# Patient Record
Sex: FEMALE | Race: WHITE | NOT HISPANIC OR LATINO | URBAN - METROPOLITAN AREA
[De-identification: names, ages, dates, MRNs, and addresses within clinical notes are randomized per-mention and may not be internally consistent; named-entity substitution may affect disease eponyms.]

---

## 2021-01-08 ENCOUNTER — OFFICE VISIT (OUTPATIENT)
Dept: URBAN - METROPOLITAN AREA CLINIC 31 | Facility: CLINIC | Age: 41
End: 2021-01-08

## 2021-02-02 ENCOUNTER — TELEPHONE ENCOUNTER (OUTPATIENT)
Dept: URBAN - METROPOLITAN AREA CLINIC 35 | Facility: CLINIC | Age: 41
End: 2021-02-02

## 2021-02-02 PROBLEM — 128302006: Status: ACTIVE | Noted: 2019-04-04

## 2021-02-05 ENCOUNTER — OFFICE VISIT (OUTPATIENT)
Dept: URBAN - METROPOLITAN AREA CLINIC 31 | Facility: CLINIC | Age: 41
End: 2021-02-05

## 2021-02-05 ENCOUNTER — DASHBOARD ENCOUNTERS (OUTPATIENT)
Age: 41
End: 2021-02-05

## 2021-02-05 VITALS
BODY MASS INDEX: 24.99 KG/M2 | HEART RATE: 82 BPM | DIASTOLIC BLOOD PRESSURE: 76 MMHG | WEIGHT: 150 LBS | OXYGEN SATURATION: 98 % | SYSTOLIC BLOOD PRESSURE: 110 MMHG | HEIGHT: 65 IN

## 2021-02-05 RX ORDER — UBIDECARENONE 30 MG
AS DIRECTED CAPSULE ORAL
Status: ACTIVE | COMMUNITY

## 2021-02-05 RX ORDER — GLECAPREVIR AND PIBRENTASVIR 40; 100 MG/1; MG/1
3 TABLETS TABLET, FILM COATED ORAL ONCE A DAY
Qty: 180 TABLET | Refills: 0 | Status: ON HOLD | COMMUNITY
Start: 2019-05-20

## 2021-02-05 RX ORDER — BIOTIN 100 %
AS DIRECTED POWDER (GRAM) MISCELLANEOUS
Status: ACTIVE | COMMUNITY

## 2021-02-05 NOTE — HPI-MIGRATED HPI
;     Hepatitis C : Patient presents today for a Hep C follow, she finished her 8 week Mavyret treatment Sept 28 th 2019 without any complications and is here today to go over lab results. [facility] She has no concerns at this present time.     Last visit (1/10/2020) Patient presents today for a Hep C follow, she finished her 8 week Mavyret treatment Sept 28 th without any complications and is here today to go over lab results.   Patient admits 1 BM per day, with normal stool. Patient denies melena, blood, or mucus in stool.   Patient admits 1 episode a week of nausea when she is the passenger in the car; motion sickness related.   Patient denies any abdominal pain, no vomiting at this time.  All her labs drawn 1/3/2020, 12 weeks after completing Hep C treatment, including CBC, BMP, Liver panel were all NL and her Quantitative Hep C RNA came back Not detected.   No new issues from GI standpoint.  Last visit (10/11/2019) Patient presents today for a Hep C follow up and to review lab results. She finished her 8 week Mavyret treatment Sept 28 th without any complications.  Patient admits 1 BM's a day with stools normal in form. Patient denies any rectal bleeding at this time. She admits that she has completed her course of Mavyret and she denies any associated symptoms.   Patient denies any abdominal pain, N/V at this time.  Interestingly, patient had been having"inflammation"/pain in both hips for last year and since on Mavyret the pain has resolved. Overall she feels better. She will have pains in abdomen and she had been avoiding gluten thinking it was the cause. That has also resolved since hep C treatment.  All her labs 4 weeks after the start of tx including CBC, CMP were NL and her Quantitative hep C RNA came back not detected.   Last visit (8/30/2019) Patient presents today for a Hep C follow up and to review lab results. She continues to take Mavyret without any complications.  Patient admits 1 BMs a day with stools loose with semi form some day.  Patient denies any abdominal pain, N/V at this time.  Patient  is tolerating Mayest well; she is experiencing some HA's and feels a bit tire but otherwise doing well.   Patient has another 4 weeks to go. Patient will be due in a couple of days for repeat labs including Quantitative hep C RNA by PCR.  Last visit (8/2/2019) Patient presents today for a Hep C follow up. She has chronic hepatitis C, Non cirrhotic, Treatment naive, Genotype 1 A, viral load of 659,000 IU/ML, Hepatitis B s Ag Negative. Patient has finally received her Mavyret in the mail and is here to receive instructions to begin treatment.   Patient admits 1-2 BMs a day with stools loose with semi form some day.  Patient denies any abdominal pain, N/V at this time.  She is currently asymptomatic from GI standpoint and has no new issues. She has stopped all her supplements; no herbal products. She does not take GERD medications. (Last visit 5/10/19) Patient presents today for a follow up for Hep C. Patient had labs completed as well as U/S with results listed below.   Patient admits 1-2 BMs a day with stools normal in form.   Today she has no complains. Patient tells me her  and daughter were tested for hep C with negative results. Patient is eager to start treatment.  (Last Visit 4/4/19) 39 y/o female patient presents today for a consultation for positive Hep C. Patient admits that she has always felt fatigue even as a child. She denies any abdominal pain.  She admits light handedness, dizziness, and easy bruising.   Patient admits 1 BM a day with stools soft with the use of Triphala Supplement. Patient admits that if she doesn't take the supplement her stools will be harder. She denies any strenuous BMs at this time.   Patient denies rectal bleeding, melena, or mucus.  Patient denies N/V.    Patient was told that last year her LFTs were abnormal. This yr, LFTs were normal but a hepatitis C test was collected given prior results and hep C test came back positive. Then according to patient confirmatory hepatitis C was positive as well. She has been feeling tire but that is not new. No hx of J/DU/AS. No abdominal pain. No N/V. Normal BMs. No visible blood in stool or melena.  No hematemesis. No abdominal distention. No confusion but sometimes feels "foggy". She has no risks factors for hepatitis C other than snorting drugs for a short while at age 17. Supplements she is on: Triphala, Tumeric, Collagen Supplement.;

## 2021-02-12 LAB
HCV AB: >11
HCV RNA NAA QUALITATIVE: (no result)
REQUEST PROBLEM: (no result)

## 2021-02-14 ENCOUNTER — TELEPHONE ENCOUNTER (OUTPATIENT)
Dept: URBAN - METROPOLITAN AREA CLINIC 35 | Facility: CLINIC | Age: 41
End: 2021-02-14

## 2021-04-16 LAB
ALBUMIN, SERUM: 4.8
ALKALINE PHOSPHATASE, S: 69
ALT (SGPT): 13
AST (SGOT): 19
BILIRUBIN, DIRECT: 0.24
BILIRUBIN, TOTAL: 1.2
HCV LOG10: (no result)
HEPATITIS C QUANTITATION: (no result)
PROTEIN, TOTAL, SERUM: 7
TEST INFORMATION:: (no result)

## 2021-04-30 ENCOUNTER — TELEPHONE ENCOUNTER (OUTPATIENT)
Dept: URBAN - METROPOLITAN AREA CLINIC 35 | Facility: CLINIC | Age: 41
End: 2021-04-30

## 2022-02-23 ENCOUNTER — SKIN CANCER EXAM (OUTPATIENT)
Dept: URBAN - METROPOLITAN AREA CLINIC 36 | Facility: CLINIC | Age: 42
Setting detail: DERMATOLOGY
End: 2022-02-23

## 2022-02-23 DIAGNOSIS — S40.262A INSECT BITE (NONVENOMOUS) OF LEFT SHOULDER, INITIAL ENCOUNTER: ICD-10-CM

## 2022-02-23 DIAGNOSIS — D48.5 NEOPLASM OF UNCERTAIN BEHAVIOR OF SKIN: ICD-10-CM

## 2022-02-23 PROBLEM — D18.01 HEMANGIOMA OF SKIN AND SUBCUTANEOUS TISSUE: Status: RESOLVED | Noted: 2022-02-23

## 2022-02-23 PROBLEM — D18.01 HEMANGIOMA OF SKIN AND SUBCUTANEOUS TISSUE: Status: ACTIVE | Noted: 2022-02-23

## 2022-02-23 PROCEDURE — 99203 OFFICE O/P NEW LOW 30 MIN: CPT

## 2022-02-23 PROCEDURE — 11300 SHAVE SKIN LESION 0.5 CM/<: CPT
